# Patient Record
Sex: FEMALE | Race: OTHER | NOT HISPANIC OR LATINO
[De-identification: names, ages, dates, MRNs, and addresses within clinical notes are randomized per-mention and may not be internally consistent; named-entity substitution may affect disease eponyms.]

---

## 2017-01-24 ENCOUNTER — APPOINTMENT (OUTPATIENT)
Dept: NEUROSURGERY | Facility: CLINIC | Age: 82
End: 2017-01-24

## 2017-01-24 VITALS
WEIGHT: 111 LBS | DIASTOLIC BLOOD PRESSURE: 52 MMHG | BODY MASS INDEX: 21.79 KG/M2 | HEART RATE: 65 BPM | SYSTOLIC BLOOD PRESSURE: 139 MMHG | HEIGHT: 60 IN

## 2017-01-24 DIAGNOSIS — M54.5 LOW BACK PAIN: ICD-10-CM

## 2017-01-24 DIAGNOSIS — M48.06 SPINAL STENOSIS, LUMBAR REGION: ICD-10-CM

## 2017-01-24 DIAGNOSIS — M54.30 SCIATICA, UNSPECIFIED SIDE: ICD-10-CM

## 2017-01-24 DIAGNOSIS — M41.9 SCOLIOSIS, UNSPECIFIED: ICD-10-CM

## 2017-11-18 ENCOUNTER — EMERGENCY (EMERGENCY)
Facility: HOSPITAL | Age: 82
LOS: 1 days | Discharge: HOME | End: 2017-11-18
Attending: EMERGENCY MEDICINE | Admitting: EMERGENCY MEDICINE
Payer: MEDICARE

## 2017-11-18 VITALS
DIASTOLIC BLOOD PRESSURE: 68 MMHG | OXYGEN SATURATION: 98 % | TEMPERATURE: 99 F | HEART RATE: 71 BPM | RESPIRATION RATE: 16 BRPM | SYSTOLIC BLOOD PRESSURE: 177 MMHG

## 2017-11-18 VITALS
DIASTOLIC BLOOD PRESSURE: 77 MMHG | TEMPERATURE: 98 F | SYSTOLIC BLOOD PRESSURE: 155 MMHG | HEART RATE: 70 BPM | OXYGEN SATURATION: 96 % | RESPIRATION RATE: 16 BRPM

## 2017-11-18 PROCEDURE — 10060 I&D ABSCESS SIMPLE/SINGLE: CPT

## 2017-11-18 PROCEDURE — 99283 EMERGENCY DEPT VISIT LOW MDM: CPT | Mod: 25,GC

## 2017-11-18 PROCEDURE — 99283 EMERGENCY DEPT VISIT LOW MDM: CPT | Mod: 25

## 2017-11-18 RX ORDER — CEPHALEXIN 500 MG
1 CAPSULE ORAL
Qty: 14 | Refills: 0 | OUTPATIENT
Start: 2017-11-18 | End: 2017-11-25

## 2017-11-18 RX ORDER — CEPHALEXIN 500 MG
500 CAPSULE ORAL ONCE
Qty: 0 | Refills: 0 | Status: COMPLETED | OUTPATIENT
Start: 2017-11-18 | End: 2017-11-18

## 2017-11-18 RX ADMIN — Medication 500 MILLIGRAM(S): at 15:28

## 2017-11-18 NOTE — ED ADULT NURSE NOTE - PSH
History  1973  Status Post Cataract Extraction (L) '06    Status Post Cataract Extraction (R) '05    Tubal Ligation

## 2017-11-18 NOTE — ED ADULT NURSE NOTE - PMH
Arthritis    Degenerative Joint Disease    Hyperlipidemia    Hypertension    Hypothyroidism    Incomplete Uterovaginal Prolapse    Perforated Eardrum Left    Uterine Prolapse

## 2017-11-18 NOTE — ED PROVIDER NOTE - MEDICAL DECISION MAKING DETAILS
84F, presenting with scalp wound drainage. Atraumatic, afebrile. concern for lipoma. 84F, presenting with scalp wound drainage. Atraumatic, afebrile. concern for infected sebaceous cyst. plan for I&D and antibiotic course with plastics follow-up.

## 2017-11-18 NOTE — ED PROVIDER NOTE - ATTENDING CONTRIBUTION TO CARE
PT eldelry female hx of cyst on scalp for years, now complains of pain swelling same for past two days NO fever chills,trauma. PE WDWN female swelling to scalp draining infected sebaceous material normocephalic atraumatic chest clear anterior & posterior abd soft neuro no focal defects  Frank Avalos MD, [facility]

## 2017-11-18 NOTE — ED PROVIDER NOTE - CARE PLAN
Instructions for follow-up, activity and diet:	Please follow-up with your primary care doctor in the next 24-48 hours   Please follow-up with plastic surgery for cyst removal   If you experience fever, continued drainage of pus, chest pain or difficulty breathing please return to the emergency department Principal Discharge DX:	Cyst of skin and subcutaneous tissue  Instructions for follow-up, activity and diet:	Please follow-up with your primary care doctor in the next 24-48 hours   Please follow-up with plastic surgery for cyst removal   If you experience fever, continued drainage of pus, chest pain or difficulty breathing please return to the emergency department

## 2017-11-18 NOTE — ED PROVIDER NOTE - PLAN OF CARE
Please follow-up with your primary care doctor in the next 24-48 hours   Please follow-up with plastic surgery for cyst removal   If you experience fever, continued drainage of pus, chest pain or difficulty breathing please return to the emergency department

## 2017-11-18 NOTE — ED PROVIDER NOTE - OBJECTIVE STATEMENT
84F, PMH of HTN and hypothyroidism presenting with two days of wound drainage. Patient reports having two bumps on her head that were examined by her doctor in the past but was told there was nothing to do. Two days ago one of the bumps began to drain yellow fluid and hurts when pressed. Denies any fever, headache, chest pain, shortness of breath, belly pain or dysuria.

## 2017-11-18 NOTE — ED PROVIDER NOTE - PHYSICAL EXAMINATION
General: well appearing female in no acute distress  HEENT: two, 2cm elevated lesions on scalp, front lesion with erythema and pus, mildly tender to palpation   Respiratory: normal work of breathing

## 2020-08-20 DIAGNOSIS — I10 ESSENTIAL (PRIMARY) HYPERTENSION: ICD-10-CM

## 2023-11-26 NOTE — ED PROVIDER NOTE - RESPIRATORY NEGATIVE STATEMENT, MLM
no chest pain, no cough, and no shortness of breath. Well appearing, awake, alert, oriented to person, place, time/situation and in no apparent distress. normal...